# Patient Record
Sex: FEMALE | Race: AMERICAN INDIAN OR ALASKA NATIVE | URBAN - METROPOLITAN AREA
[De-identification: names, ages, dates, MRNs, and addresses within clinical notes are randomized per-mention and may not be internally consistent; named-entity substitution may affect disease eponyms.]

---

## 2023-01-18 ENCOUNTER — OFFICE VISIT (OUTPATIENT)
Facility: LOCATION | Age: 53
End: 2023-01-18
Payer: COMMERCIAL

## 2023-01-18 DIAGNOSIS — E11.3293 TYPE 2 DIAB W MILD NONPRLF DIABETIC RTNOP W/O MACULAR EDEMA, BILATERAL: Primary | ICD-10-CM

## 2023-01-18 DIAGNOSIS — H25.013 CORTICAL AGE-RELATED CATARACT, BILATERAL: ICD-10-CM

## 2023-01-18 PROCEDURE — 99204 OFFICE O/P NEW MOD 45 MIN: CPT

## 2023-01-18 PROCEDURE — 92134 CPTRZ OPH DX IMG PST SGM RTA: CPT

## 2023-01-18 ASSESSMENT — KERATOMETRY
OS: 45.88
OD: 45.13

## 2023-01-18 ASSESSMENT — VISUAL ACUITY
OS: 20/20
OD: 20/20

## 2023-01-18 ASSESSMENT — INTRAOCULAR PRESSURE
OD: 15
OS: 15

## 2023-01-18 NOTE — IMPRESSION/PLAN
Impression: Type 2 diab w mild nonprlf diabetic rtnop w/o macular edema, bilateral: A36.9358. Plan: Pt educated on findings. Stressed importance of BS/BP control and continued care with PCP/endocrinologist. Will write letter to PCP/endocrinologist regarding exam findings.  Monitor annually with DFE with mac OCT/optos

## 2023-01-18 NOTE — IMPRESSION/PLAN
Impression: Cortical age-related cataract, bilateral: H25.013. Plan: Patient educated on condition. Cataract surgery indicated at this time. Vision and activities of daily life are being affected. Referred pt for cataract surgery. Referred from her normal eye care provider. Pt does not qualify with [facility] requirements. Pt states that her ADLs are being significantly affected and wants to talk to surgeon about sx.  

Surgery considerations: Diabetic

## 2023-04-28 ENCOUNTER — TESTING ONLY (OUTPATIENT)
Facility: LOCATION | Age: 53
End: 2023-04-28
Payer: COMMERCIAL

## 2023-04-28 DIAGNOSIS — H25.013 CORTICAL AGE-RELATED CATARACT, BILATERAL: Primary | ICD-10-CM

## 2023-04-28 PROCEDURE — 92025 CPTRIZED CORNEAL TOPOGRAPHY: CPT | Performed by: OPHTHALMOLOGY

## 2023-04-28 ASSESSMENT — PACHYMETRY
OS: 23.07
OS: 3.27
OD: 3.50
OD: 23.15

## 2023-05-03 ENCOUNTER — PRE-OPERATIVE VISIT (OUTPATIENT)
Facility: LOCATION | Age: 53
End: 2023-05-03
Payer: COMMERCIAL

## 2023-05-03 DIAGNOSIS — H25.013 CORTICAL AGE-RELATED CATARACT, BILATERAL: Primary | ICD-10-CM

## 2023-05-03 DIAGNOSIS — E11.3293 TYPE 2 DIAB W MILD NONPRLF DIABETIC RTNOP W/O MACULAR EDEMA, BILATERAL: ICD-10-CM

## 2023-05-03 PROCEDURE — 99204 OFFICE O/P NEW MOD 45 MIN: CPT | Performed by: OPHTHALMOLOGY

## 2023-05-03 RX ORDER — KETOROLAC TROMETHAMINE 5 MG/ML
0.5 % SOLUTION OPHTHALMIC
Qty: 10 | Refills: 0 | Status: ACTIVE
Start: 2023-05-03

## 2023-05-03 RX ORDER — PREDNISOLONE ACETATE 10 MG/ML
1 % SUSPENSION/ DROPS OPHTHALMIC
Qty: 10 | Refills: 1 | Status: ACTIVE
Start: 2023-05-03

## 2023-05-03 ASSESSMENT — VISUAL ACUITY
OS: 20/20
OD: 20/30

## 2023-05-03 ASSESSMENT — INTRAOCULAR PRESSURE
OS: 14
OD: 15

## 2023-05-03 ASSESSMENT — KERATOMETRY
OS: 45.88
OD: 45.13

## 2023-05-03 NOTE — IMPRESSION/PLAN
Impression: Cortical age-related cataract, bilateral: H25.013. Plan: OK for CE/Phaco/IOL/ Trypan Blue OD then OS  in ASC, RL2. Add Omidria and PRED/Ketorolac post surgery. Distance target. Discussed lens options, Toric/Trifocal/ORA. Discussed intralacrimal Dextenza and/or intracameral Dexycu/Moxifloxacin. Pt is a candidate for multifocal lens. Discussed need for glasses for near vision with standard IOL and possibly Trifocal as well. Discussed diagnosis of cataracts. Cataracts are limiting vision. Discussed risks, benefits and alternatives to surgery including but not limited to: bleeding, infection, risk of vision loss, loss of the eye, need for other surgery. Patient voiced understanding and wishes to proceed. 
Schedule JENNIFER, Keiko, H&P, CE/Phaco/IOL/ Trypan Blue OD then OS

## 2023-05-12 ENCOUNTER — POST-OPERATIVE VISIT (OUTPATIENT)
Facility: LOCATION | Age: 53
End: 2023-05-12
Payer: COMMERCIAL

## 2023-05-12 DIAGNOSIS — Z48.810 ENCOUNTER FOR SURGICAL AFTERCARE FOLLOWING SURGERY ON A SENSE ORGAN: Primary | ICD-10-CM

## 2023-05-12 PROCEDURE — 99024 POSTOP FOLLOW-UP VISIT: CPT

## 2023-05-12 ASSESSMENT — INTRAOCULAR PRESSURE
OD: 17
OS: 15

## 2023-05-12 NOTE — IMPRESSION/PLAN
Impression:  Encounter for surgical aftercare following surgery on a sense organ  Z48.810. Plan: Patient presents for one day post op. Patient advised they are healing well from surgery. Patient advised to refrain from heavy lifting, dirty or mallory environments, water or sweat in the eye and to avoid bending past the hip. Patient advised to wear eye shield at night and refrain from rubbing the eye. Patient was also advised to keep appointment in one week for follow up. Patient to call office with any increased pain or decreased vision. Start pred QID x1 week, then taper 3/2/1 Start ketorolac TID x3 week

## 2023-05-18 ENCOUNTER — POST-OPERATIVE VISIT (OUTPATIENT)
Facility: LOCATION | Age: 53
End: 2023-05-18
Payer: COMMERCIAL

## 2023-05-18 DIAGNOSIS — Z48.810 ENCOUNTER FOR SURGICAL AFTERCARE FOLLOWING SURGERY ON A SENSE ORGAN: Primary | ICD-10-CM

## 2023-05-18 PROCEDURE — 99024 POSTOP FOLLOW-UP VISIT: CPT | Performed by: OPTOMETRIST

## 2023-05-18 ASSESSMENT — KERATOMETRY
OS: 45.75
OD: 50.13

## 2023-05-18 ASSESSMENT — VISUAL ACUITY: OD: 20/20

## 2023-05-18 ASSESSMENT — INTRAOCULAR PRESSURE
OD: 15
OS: 14

## 2023-05-18 NOTE — IMPRESSION/PLAN
Impression: S/P [id number]; Cataract Extraction by phacoemulsification with IOL placement; ORA; ERX; Trypan Blue OD - 7 Days. Encounter for surgical aftercare following surgery on a sense organ  Z48.810. Plan: Patient is healing well following cataract surgery. Inflammation and vision has improved. Patient may return to regular activities. Patient may continue AT PRN. OD: Continue Prednisolone TID with weekly tapering and Ketorlac TID x3 weeks.